# Patient Record
(demographics unavailable — no encounter records)

---

## 2024-11-08 NOTE — ASSESSMENT
[FreeTextEntry1] : Dyspepsia: The patient complains of dyspeptic symptoms.  The patient was advised to continue to abide by an anti-gas (low FOD-MAP) diet.  The patient was previously given a pamphlet for anti-gas (low FOD-MAP).  The patient and I reviewed the anti-gas (low FOD-MAP)diet at length again. The patient is to continue on a trial of Simethicone one tablet 4 times a day p.r.n. abdominal pain and gas. GERD: The patient was advised to avoid late-night meals and dietary indiscretions.  The patient was advised to avoid fried and fatty foods.  The patient was advised to abide by an anti-GERD diet. The patient was given a pamphlet for anti-GERD.  The patient and I reviewed the anti-GERD diet at length. I recommend a trial of Pantoprazole 40 mg once a day x 3 months for the symptoms. Nausea: The patient complains of nausea. If the symptoms of nausea persists, the patient may require a trial of Zofran 4 mg twice a day.  Diarrhea: The patient complains of diarrhea.  I recommend a low FOD-MAP diet. The patient is to avoid fiber supplementation. The patient is to consider starting a trial of a probiotic such as Align once a day.   If the symptoms persist, the patient may require sending stool studies for C+S, O+P x3, and C. difficile to assess for an infectious etiology of the diarrhea.  T If the symptoms persist, the patient may require a colonoscopy to assess for colitis versus other causes.  The patient was told of the risks and benefits of the procedure.  The patient was told of the risks of perforation, emergency surgery, bleeding, infections and missed lesions.  The patient agreed and will follow-up to reassess the symptoms.   Diverticulosis: I recommend a low residue diet and avoid seeds. Internal Hemorrhoids: The patient is to consider a trial of Anusol H. C. suppositories one per rectum nightly and Anusol HC2.5% cream apply to affected area twice a day p.r.n. hemorrhoidal bleeding or pain. Left Sided Ulcerative Colitis: The patient has a history of ulcerative colitis. The patient has been taking medications for the ulcerative colitis. The symptoms of diarrhea, abdominal pain and occasional lower GI bleeding have resolved. The patient and I had another discussion regarding the ulcerative colitis and potential complications. The patient was also told of the need for close follow-up and continued treatment. The patient was again told of the importance for follow-up in order to avoid exacerbations of the disease. I recommend abide by a low FOD-MAP diet and avoid seeds. I recommend a repeat colonoscopy in 1 year to reassess for colonic polyps, colitis and dysplasia unless symptomatic. I recommend continue on the trial of Mesalamine 1.2 g to (2 tablets twice a day) for the ulcerative proctitis. Blood Work: I recommend obtaining the recent blood work performed by the patient's PMD. Imaging Study: I recommend an imaging study to assess the symptoms. I recommend an abdominal ultrasound to assess for gallstones. I recommend cardiac evaluation to assess his symptoms. Follow-up: The patient is to follow-up in the office in 6 months to reassess the symptoms. The patient was told to call the office if any further problems.

## 2024-11-08 NOTE — HISTORY OF PRESENT ILLNESS
[FreeTextEntry1] : The colonoscopy to the terminal ileum performed at the [facility] GI endoscopy suite on September 29, 2022 revealed a transverse colon polyp, mild pandiverticulosis that was primarily concentrated to the left colon, mild rectal erythema suggestive of proctitis and small internal hemorrhoids.  The colonic polyp was snared and removed.  There was no bleeding post polypectomy. Random biopsies were also taken of the terminal ileum, cecum, ascending colon, transverse colon, descending colon and sigmoid colon and rectum to assess for ileitis and microscopic colitis. There were no other polyps, masses or AVMs noted.  The colonic pathology performed on September 29, 2022 revealed ileal mucosa without significant pathology (terminal ileum), colonic mucosa without significant pathology (cecum), colonic mucosa with preserved crypt architecture with no evidence of active inflammation, chronic epithelial damage or intraepithelial lymphocytosis (ascending colon, transverse colon, descending colon and sigmoid colon), hyperplastic polyp (transverse colon polyp at 80 cm) and colonic mucosa with altered crypt architecture and an increased population of chronic inflammatory cells with lymphoid follicles (rectum).

## 2025-06-17 NOTE — HISTORY OF PRESENT ILLNESS
[FreeTextEntry1] : Mr. Pelon Parr was seen on 6/17/2025. The patient is a 47-year-old man with mildly dilated aortic root (4.1 cm) and ascending thoracic aorta (4.3 cm) diagnosed in 2021, HLD and HTN. He was referred specifically to establish care with cardiology.   Patient lived in [place]. He moved to [facility]. His cardiologist also moved. He is here to establish care. He was told he had an aortic root and ascending thoracic aorta aneurysm in 2021. He had repeat testing in 5/2025 with "unchanged" diameters.    Patient admits to chronic left sided chest tightness that improves with inspiration. He denies angina. He denies shortness of breath. He has fatigue. He reports having a sleep study in 2021 that was "normal" but also mentions that his wife says he is snoring more now. He is an active individual at baseline.   Family history: patient reports no knowledge of premature CAD, SCD, device therapies or congenital heart disease in the family. Mother had DM and strokes. Maternal uncle with MI 47 years old.  Social history: patient denies smoking or using illicit drugs. He drinks 10 beers per week. . 2 children. He works in the [employer] industry.  CV meds: losartan/HCTZ 100/25 mg daily and atorvastatin 80 mg QHS.

## 2025-06-17 NOTE — REASON FOR VISIT
[CV Risk Factors and Non-Cardiac Disease] : CV risk factors and non-cardiac disease [FreeTextEntry3] : Barb Moralez MD-[id number] [place] [address] [FreeTextEntry1] : History was provided by patient and chart review. He was alone during the encounter.

## 2025-06-17 NOTE — REASON FOR VISIT
[CV Risk Factors and Non-Cardiac Disease] : CV risk factors and non-cardiac disease [FreeTextEntry3] : Barb Moralez MD-[id number] [place] [address] [FreeTextEntry1] : History was provided by patient and chart review. He was alone during the encounter.

## 2025-06-17 NOTE — DISCUSSION/SUMMARY
[FreeTextEntry1] : Mr. Pelon Parr is a 47-year-old man with   1. Mildly dilated aortic root (4.1 cm) by absolute diameter. Normal when indexed to body surface area.  2. Mildly dilated ascending thoracic aorta (4.3 cm) by absolute diameter. Normal when indexed to body surface area.  3. HLD 4. HTN.   He was diagnosed with aortic root and ascending thoracic aortic aneurysm in 2021 but based on a body surface area of 2.53 m2 by [place] formula, the measurements are within reference range. He had a follow up scan in 5/2025 that reportedly demonstrated no change in aortic dimensions. We discussed that his aortic size is normal for BSA. Regardless, I think it warrants surveillance for now.   He did have pulmonary nodules that are being followed by his PCP.   In regard to next steps, I do recommend an echocardiogram to ensure there is no evidence of bicuspid aortic valve and he has chronic chest pain albeit does not sound like angina. I also want to assess his aortic dimensions.   I recommend he continue his blood pressure medicines for now and his statin. He reports that his PCP manages BP and lipids for now.   I recommended lifestyle modifications for long-term cardiovascular health, including adopting a Mediterranean diet pattern to lower cardiovascular disease risk. I recommended at least 150 minutes per week of moderate-intensity exercise or 75 minutes of vigorous activity combined with muscle strengthening activities at least twice weekly. I reviewed cardiac alarm symptoms with the patient and next steps if the patient experiences such symptoms.   I recommend a follow-up appointment with me in 12 months. I will ask my team to upload his CT scans into the EMR.   I emphasized the importance of follow-up care as it can significantly impact health outcomes. Failure to follow through with recommended care can lead to complications and increased risk of adverse events.   The patient agreed with the above recommendations and was appreciative of the care provided. The patient should continue seeing their primary care physician for overall health maintenance. I am happy to answer any further questions so please call my office if needed. Thank you for the opportunity to participate in the care of Mr. Parr.      Armand Duke DO, [facility] Cardio-Obstetrics Cardiologist Adult Congenital Heart Disease Cardiologist [address], [address] [place] [address] Office telephone number [phone]  [EKG obtained to assist in diagnosis and management of assessed problem(s)] : EKG obtained to assist in diagnosis and management of assessed problem(s)

## 2025-06-17 NOTE — DISCUSSION/SUMMARY
[FreeTextEntry1] : Mr. Pelon Parr is a 47-year-old man with   1. Mildly dilated aortic root (4.1 cm) by absolute diameter. Normal when indexed to body surface area.  2. Mildly dilated ascending thoracic aorta (4.3 cm) by absolute diameter. Normal when indexed to body surface area.  3. HLD 4. HTN.   He was diagnosed with aortic root and ascending thoracic aortic aneurysm in 2021 but based on a body surface area of 2.53 m2 by [place] formula, the measurements are within reference range. He had a follow up scan in 5/2025 that reportedly demonstrated no change in aortic dimensions. We discussed that his aortic size is normal for BSA. Regardless, I think it warrants surveillance for now.   He did have pulmonary nodules that are being followed by his PCP.   In regard to next steps, I do recommend an echocardiogram to ensure there is no evidence of bicuspid aortic valve and he has chronic chest pain albeit does not sound like angina. I also want to assess his aortic dimensions.   I recommend he continue his blood pressure medicines for now and his statin. He reports that his PCP manages BP and lipids for now.   I recommended lifestyle modifications for long-term cardiovascular health, including adopting a Mediterranean diet pattern to lower cardiovascular disease risk. I recommended at least 150 minutes per week of moderate-intensity exercise or 75 minutes of vigorous activity combined with muscle strengthening activities at least twice weekly. I reviewed cardiac alarm symptoms with the patient and next steps if the patient experiences such symptoms.   I recommend a follow-up appointment with me in 12 months. I will ask my team to upload his CT scans into the EMR.   I emphasized the importance of follow-up care as it can significantly impact health outcomes. Failure to follow through with recommended care can lead to complications and increased risk of adverse events.   The patient agreed with the above recommendations and was appreciative of the care provided. The patient should continue seeing their primary care physician for overall health maintenance. I am happy to answer any further questions so please call my office if needed. Thank you for the opportunity to participate in the care of Mr. Parr.      Armand Duke DO, [facility] Cardio-Obstetrics Cardiologist Adult Congenital Heart Disease Cardiologist [address], [address] [place] [address] Office telephone number [phone]  [EKG obtained to assist in diagnosis and management of assessed problem(s)] : EKG obtained to assist in diagnosis and management of assessed problem(s)

## 2025-06-17 NOTE — CARDIOLOGY SUMMARY
[de-identified] : 6/17/25 sinus bradycardia with ventricular rate of 58 bpm. DC interval 160 ms and QRS duration of 89 ms. Normal axis and intervals. QTc 393 ms. No chamber enlargement or hypertrophy. No ST/T changes.  [de-identified] : CTA chest Aortic root 4.1 cm and ascending thoracic aorta 4.3 cm (5/2025). He brought the report for me to review.

## 2025-06-17 NOTE — HISTORY OF PRESENT ILLNESS
[FreeTextEntry1] : Mr. Pelon Parr was seen on 6/17/2025. The patient is a 47-year-old man with mildly dilated aortic root (4.1 cm) and ascending thoracic aorta (4.3 cm) diagnosed in 2021, HLD and HTN. He was referred specifically to establish care with cardiology.   Patient lived in [place]. He moved to [facility]. His cardiologist also moved. He is here to establish care. He was told he had an aortic root and ascending thoracic aorta aneurysm in 2021. He had repeat testing in 5/2025 with "unchanged" diameters.    Patient admits to chronic left sided chest tightness that improves with inspiration. He denies angina. He denies shortness of breath. He has fatigue. He reports having a sleep study in 2021 that was "normal" but also mentions that his wife says he is snoring more now. He is an active individual at baseline.   Family history: patient reports no knowledge of premature CAD, SCD, device therapies or congenital heart disease in the family. Mother had DM and strokes. Maternal uncle with MI 47 years old.  Social history: patient denies smoking or using illicit drugs. He drinks 10 beers per week. . 2 children. He works in the [employer] industry.  CV meds: losartan/HCTZ 100/25 mg daily and atorvastatin 80 mg QHS.

## 2025-06-17 NOTE — CARDIOLOGY SUMMARY
[de-identified] : 6/17/25 sinus bradycardia with ventricular rate of 58 bpm. [place] interval 160 ms and QRS duration of 89 ms. Normal axis and intervals. QTc 393 ms. No chamber enlargement or hypertrophy. No ST/T changes.  [de-identified] : CTA chest Aortic root 4.1 cm and ascending thoracic aorta 4.3 cm (5/2025). He brought the report for me to review.

## 2025-06-25 NOTE — PHYSICAL EXAM
[General Appearance - Well Developed] : well developed [General Appearance - Well Nourished] : well nourished [] : no respiratory distress [Urethral Meatus] : meatus normal [Penis Abnormality] : normal circumcised penis [Testes Tenderness] : no tenderness of the testes [Testes Mass (___cm)] : there were no testicular masses [Prostate Tenderness] : the prostate was not tender [No Prostate Nodules] : no prostate nodules [Prostate Size ___ (0-4)] : prostate size [unfilled] (scale: 0-4) [Oriented To Time, Place, And Person] : oriented to person, place, and time [Chaperone Present] : A chaperone was present in the examining room during all aspects of the physical examination